# Patient Record
Sex: FEMALE | Race: WHITE | NOT HISPANIC OR LATINO | URBAN - METROPOLITAN AREA
[De-identification: names, ages, dates, MRNs, and addresses within clinical notes are randomized per-mention and may not be internally consistent; named-entity substitution may affect disease eponyms.]

---

## 2023-09-07 ENCOUNTER — OFFICE (OUTPATIENT)
Dept: URBAN - METROPOLITAN AREA CLINIC 19 | Facility: CLINIC | Age: 28
End: 2023-09-07

## 2023-09-07 VITALS
HEIGHT: 64 IN | OXYGEN SATURATION: 97 % | DIASTOLIC BLOOD PRESSURE: 82 MMHG | HEART RATE: 92 BPM | SYSTOLIC BLOOD PRESSURE: 131 MMHG | WEIGHT: 180 LBS

## 2023-09-07 DIAGNOSIS — R19.7 DIARRHEA, UNSPECIFIED: ICD-10-CM

## 2023-09-07 DIAGNOSIS — R12 HEARTBURN: ICD-10-CM

## 2023-09-07 DIAGNOSIS — R10.31 RIGHT LOWER QUADRANT PAIN: ICD-10-CM

## 2023-09-07 DIAGNOSIS — R11.0 NAUSEA: ICD-10-CM

## 2023-09-07 DIAGNOSIS — Z83.71 FAMILY HISTORY OF COLONIC POLYPS: ICD-10-CM

## 2023-09-07 PROCEDURE — 99204 OFFICE O/P NEW MOD 45 MIN: CPT

## 2023-09-07 RX ORDER — SODIUM PICOSULFATE, MAGNESIUM OXIDE, AND ANHYDROUS CITRIC ACID 10; 3.5; 12 MG/160ML; G/160ML; G/160ML
LIQUID ORAL
Qty: 350 | Refills: 0 | Status: COMPLETED
Start: 2023-09-07 | End: 2023-12-20

## 2023-09-07 RX ORDER — PANTOPRAZOLE SODIUM 40 MG/1
40 TABLET, DELAYED RELEASE ORAL
Qty: 30 | Refills: 11 | Status: ACTIVE
Start: 2023-09-07

## 2023-09-07 RX ORDER — DICYCLOMINE HYDROCHLORIDE 10 MG/1
CAPSULE ORAL
Qty: 90 | Refills: 2 | Status: ACTIVE

## 2023-09-07 NOTE — SERVICEHPINOTES
28-year-old single white female with ADHD and bipolar here for evaluation of chronic right lower quadrant pain, chronic diarrhea, changes in her bowel habits and nausea.  She recently moved to [place] from [place].  She has had these symptoms for several years now and has had various workups, but they have been complicated as she has moved multiple times and had to "start over" with all of her different providers.  She has had worsening symptoms over the last several weeks.  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  She has had to take several days off of work because of her symptoms. Her bowel movements are almost always diarrhea, but occasionally she will have urgency and not be able to have a bowel movement.  She usually experiences these symptoms 5 to 6 times a day.  Sometimes the symptoms wake her up from sleep at night.  She has chronic nausea that may be worse with some of her psych medications as they have been adjusted recently.  She has occasional reflux and heartburn and takes Aleve on a regular basis.  She also is on CBD gummies which have helped somewhat with the abdominal pain.  She denies fever, dysphagia or overt GI bleeding.  She had an endoscopy a few years ago in [place] that apparently found "gastritis".  She has never had a colonoscopy.  She currently takes no GI medications.  Family history is negative for IBD.  Her mother had colon polyps at age 55.

## 2023-09-07 NOTE — SERVICENOTES
The patient's assessment was reviewed with Dr. Hein and a collaborative plan of care was established.

## 2023-09-11 LAB
C-REACTIVE PROTEIN, QUANT: <1 MG/L
CBC, PLATELET, NO DIFFERENTIAL: HEMATOCRIT: 40.9 % (ref 34–46.6)
CBC, PLATELET, NO DIFFERENTIAL: HEMOGLOBIN: 13.7 G/DL (ref 11.1–15.9)
CBC, PLATELET, NO DIFFERENTIAL: MCH: 32.2 PG (ref 26.6–33)
CBC, PLATELET, NO DIFFERENTIAL: MCHC: 33.5 G/DL (ref 31.5–35.7)
CBC, PLATELET, NO DIFFERENTIAL: MCV: 96 FL (ref 79–97)
CBC, PLATELET, NO DIFFERENTIAL: PLATELETS: 327 X10E3/UL (ref 150–450)
CBC, PLATELET, NO DIFFERENTIAL: RBC: 4.25 X10E6/UL (ref 3.77–5.28)
CBC, PLATELET, NO DIFFERENTIAL: RDW: 12 % (ref 11.7–15.4)
CBC, PLATELET, NO DIFFERENTIAL: WBC: 5.2 X10E3/UL (ref 3.4–10.8)
CELIAC DISEASE COMPREHENSIVE: DEAMIDATED GLIADIN ABS, IGA: 5 UNITS (ref 0–19)
CELIAC DISEASE COMPREHENSIVE: DEAMIDATED GLIADIN ABS, IGG: 3 UNITS (ref 0–19)
CELIAC DISEASE COMPREHENSIVE: ENDOMYSIAL ANTIBODY IGA: NEGATIVE
CELIAC DISEASE COMPREHENSIVE: IMMUNOGLOBULIN A, QN, SERUM: 218 MG/DL (ref 87–352)
CELIAC DISEASE COMPREHENSIVE: T-TRANSGLUTAMINASE (TTG) IGA: <2 U/ML
CELIAC DISEASE COMPREHENSIVE: T-TRANSGLUTAMINASE (TTG) IGG: 2 U/ML (ref 0–5)
COMP. METABOLIC PANEL (14): A/G RATIO: 1.7 (ref 1.2–2.2)
COMP. METABOLIC PANEL (14): ALBUMIN: 4.8 G/DL (ref 4–5)
COMP. METABOLIC PANEL (14): ALKALINE PHOSPHATASE: 44 IU/L (ref 44–121)
COMP. METABOLIC PANEL (14): ALT (SGPT): 157 IU/L — HIGH (ref 0–32)
COMP. METABOLIC PANEL (14): AST (SGOT): 81 IU/L — HIGH (ref 0–40)
COMP. METABOLIC PANEL (14): BILIRUBIN, TOTAL: 0.4 MG/DL (ref 0–1.2)
COMP. METABOLIC PANEL (14): BUN/CREATININE RATIO: 14 (ref 9–23)
COMP. METABOLIC PANEL (14): BUN: 12 MG/DL (ref 6–20)
COMP. METABOLIC PANEL (14): CALCIUM: 9.5 MG/DL (ref 8.7–10.2)
COMP. METABOLIC PANEL (14): CARBON DIOXIDE, TOTAL: 24 MMOL/L (ref 20–29)
COMP. METABOLIC PANEL (14): CHLORIDE: 104 MMOL/L (ref 96–106)
COMP. METABOLIC PANEL (14): CREATININE: 0.86 MG/DL (ref 0.57–1)
COMP. METABOLIC PANEL (14): EGFR: 93 ML/MIN/1.73 (ref 59–?)
COMP. METABOLIC PANEL (14): GLOBULIN, TOTAL: 2.8 G/DL (ref 1.5–4.5)
COMP. METABOLIC PANEL (14): GLUCOSE: 95 MG/DL (ref 70–99)
COMP. METABOLIC PANEL (14): POTASSIUM: 4.3 MMOL/L (ref 3.5–5.2)
COMP. METABOLIC PANEL (14): PROTEIN, TOTAL: 7.6 G/DL (ref 6–8.5)
COMP. METABOLIC PANEL (14): SODIUM: 144 MMOL/L (ref 134–144)
HCG,BETA SUBUNIT, QNT: HCG,BETA SUBUNIT,QNT,SERUM: <1 MIU/ML
SEDIMENTATION RATE-WESTERGREN: 4 MM/HR (ref 0–32)
TSH: 1.97 UIU/ML (ref 0.45–4.5)

## 2023-12-20 ENCOUNTER — OFFICE (OUTPATIENT)
Dept: URBAN - METROPOLITAN AREA PATHOLOGY 12 | Facility: PATHOLOGY | Age: 28
End: 2023-12-20
Payer: COMMERCIAL

## 2023-12-20 ENCOUNTER — AMBULATORY SURGICAL CENTER (OUTPATIENT)
Dept: URBAN - METROPOLITAN AREA SURGERY 2 | Facility: SURGERY | Age: 28
End: 2023-12-20
Payer: COMMERCIAL

## 2023-12-20 VITALS
DIASTOLIC BLOOD PRESSURE: 93 MMHG | TEMPERATURE: 98.5 F | SYSTOLIC BLOOD PRESSURE: 100 MMHG | SYSTOLIC BLOOD PRESSURE: 132 MMHG | HEART RATE: 95 BPM | RESPIRATION RATE: 16 BRPM | SYSTOLIC BLOOD PRESSURE: 128 MMHG | SYSTOLIC BLOOD PRESSURE: 125 MMHG | SYSTOLIC BLOOD PRESSURE: 142 MMHG | OXYGEN SATURATION: 97 % | RESPIRATION RATE: 18 BRPM | SYSTOLIC BLOOD PRESSURE: 125 MMHG | HEIGHT: 64 IN | SYSTOLIC BLOOD PRESSURE: 128 MMHG | HEART RATE: 95 BPM | SYSTOLIC BLOOD PRESSURE: 132 MMHG | DIASTOLIC BLOOD PRESSURE: 86 MMHG | TEMPERATURE: 98.1 F | RESPIRATION RATE: 16 BRPM | SYSTOLIC BLOOD PRESSURE: 128 MMHG | HEART RATE: 89 BPM | HEART RATE: 103 BPM | RESPIRATION RATE: 16 BRPM | HEIGHT: 64 IN | TEMPERATURE: 98.5 F | HEIGHT: 64 IN | SYSTOLIC BLOOD PRESSURE: 100 MMHG | OXYGEN SATURATION: 100 % | OXYGEN SATURATION: 100 % | HEART RATE: 71 BPM | DIASTOLIC BLOOD PRESSURE: 61 MMHG | OXYGEN SATURATION: 100 % | HEART RATE: 95 BPM | DIASTOLIC BLOOD PRESSURE: 93 MMHG | WEIGHT: 174 LBS | SYSTOLIC BLOOD PRESSURE: 142 MMHG | HEART RATE: 84 BPM | HEART RATE: 103 BPM | SYSTOLIC BLOOD PRESSURE: 100 MMHG | HEART RATE: 89 BPM | TEMPERATURE: 98.5 F | SYSTOLIC BLOOD PRESSURE: 125 MMHG | TEMPERATURE: 98.1 F | DIASTOLIC BLOOD PRESSURE: 79 MMHG | DIASTOLIC BLOOD PRESSURE: 86 MMHG | OXYGEN SATURATION: 97 % | OXYGEN SATURATION: 97 % | HEART RATE: 84 BPM | TEMPERATURE: 98.1 F | HEART RATE: 71 BPM | HEART RATE: 84 BPM | HEART RATE: 71 BPM | DIASTOLIC BLOOD PRESSURE: 79 MMHG | DIASTOLIC BLOOD PRESSURE: 61 MMHG | HEART RATE: 89 BPM | WEIGHT: 174 LBS | RESPIRATION RATE: 18 BRPM | HEART RATE: 103 BPM | RESPIRATION RATE: 18 BRPM | DIASTOLIC BLOOD PRESSURE: 93 MMHG | DIASTOLIC BLOOD PRESSURE: 79 MMHG | SYSTOLIC BLOOD PRESSURE: 132 MMHG | SYSTOLIC BLOOD PRESSURE: 142 MMHG | DIASTOLIC BLOOD PRESSURE: 86 MMHG | DIASTOLIC BLOOD PRESSURE: 61 MMHG | WEIGHT: 174 LBS

## 2023-12-20 DIAGNOSIS — K21.9 GASTRO-ESOPHAGEAL REFLUX DISEASE WITHOUT ESOPHAGITIS: ICD-10-CM

## 2023-12-20 DIAGNOSIS — R19.7 DIARRHEA, UNSPECIFIED: ICD-10-CM

## 2023-12-20 PROCEDURE — 88342 IMHCHEM/IMCYTCHM 1ST ANTB: CPT | Performed by: STUDENT IN AN ORGANIZED HEALTH CARE EDUCATION/TRAINING PROGRAM

## 2023-12-20 PROCEDURE — 43239 EGD BIOPSY SINGLE/MULTIPLE: CPT | Mod: 51 | Performed by: INTERNAL MEDICINE

## 2023-12-20 PROCEDURE — 88305 TISSUE EXAM BY PATHOLOGIST: CPT | Performed by: STUDENT IN AN ORGANIZED HEALTH CARE EDUCATION/TRAINING PROGRAM

## 2023-12-20 PROCEDURE — 45380 COLONOSCOPY AND BIOPSY: CPT | Performed by: INTERNAL MEDICINE

## 2023-12-20 NOTE — SERVICEHPINOTES
28-year-old single white female with ADHD and bipolar here for evaluation of chronic right lower quadrant pain, chronic diarrhea, changes in her bowel habits and nausea.  She recently moved to [place] from [place].  She has had these symptoms for several years now and has had various workups, but they have been complicated as she has moved multiple times and had to "start over" with all of her different providers.  She has had worsening symptoms over the last several weeks.  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  She has had to take several days off of work because of her symptoms. Her bowel movements are almost always diarrhea, but occasionally she will have urgency and not be able to have a bowel movement.  She usually experiences these symptoms 5 to 6 times a day.  Sometimes the symptoms wake her up from sleep at night.  She has chronic nausea that may be worse with some of her psych medications as they have been adjusted recently.  She has occasional reflux and heartburn and takes Aleve on a regular basis.  We gave her Protonix 40 mg/d which "helped a little bit", but she only took it for a month (11 refills were given with her month supply).  She also is on CBD gummies which have helped somewhat with the abdominal pain.  She denies fever, dysphagia or overt GI bleeding.  She had an endoscopy a few years ago in [place] that apparently found "gastritis".  Routine lab and CT abd/pelvis 9/30/23 were n normal. She has never had a colonoscopy.  She currently takes no GI medications.  Family history is negative for IBD.  Her mother had colon polyps at age 55.

## 2023-12-20 NOTE — SERVICEHPINOTES
28-year-old single white female with ADHD and bipolar here for evaluation of chronic right lower quadrant pain, chronic diarrhea, changes in her bowel habits and nausea.  She recently moved to [place] from [place].  She has had these symptoms for several years now and has had various workups, but they have been complicated as she has moved multiple times and had to "start over" with all of her different providers.  She has had worsening symptoms over the last several weeks.  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  She has had to take several days off of work because of her symptoms. Her bowel movements are almost always diarrhea, but occasionally she will have urgency and not be able to have a bowel movement.  She usually experiences these symptoms 5 to 6 times a day.  Sometimes the symptoms wake her up from sleep at night.  She has chronic nausea that may be worse with some of her psych medications as they have been adjusted recently.  She has occasional reflux and heartburn and takes Aleve on a regular basis.  We gave her Protonix 40 mg/d which "helped a little bit", but she only took it for a month (11 refills were given with her month supply).  She also is on CBD gummies which have helped somewhat with the abdominal pain.  She denies fever, dysphagia or overt GI bleeding.  She had an endoscopy a few years ago in [place] that apparently found "gastritis".  Routine lab and CT abd/pelvis 9/30/23 were n normal. She has never had a colonoscopy.  She currently takes no GI medications.  Family history is negative for IBD.  Her mother had colon polyps at age 55.

## 2023-12-20 NOTE — SERVICEHPINOTES
28-year-old single white female with ADHD and bipolar here for evaluation of chronic right lower quadrant pain, chronic diarrhea, changes in her bowel habits and nausea.  She recently moved to [place] from [place].  She has had these symptoms for several years now and has had various workups, but they have been complicated as she has moved multiple times and had to "start over" with all of her different providers.  She has had worsening symptoms over the last several weeks.  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  She has had to take several days off of work because of her symptoms. Her bowel movements are almost always diarrhea, but occasionally she will have urgency and not be able to have a bowel movement.  She usually experiences these symptoms 5 to 6 times a day.  Sometimes the symptoms wake her up from sleep at night.  She has chronic nausea that may be worse with some of her psych medications as they have been adjusted recently.  She has occasional reflux and heartburn and takes Aleve on a regular basis.  We gave her Protonix 40 mg/d which "helped a little bit", but she only took it for a month (11 refills were given with her month supply).  She also is on CBD gummies which have helped somewhat with the abdominal pain.  She denies fever, dysphagia or overt GI bleeding.  She had an endoscopy a few years ago in [place] that apparently found "gastritis".  Routine lab and CT abd/pelvis 9/30/23 were n normal. She has never had a colonoscopy.  She currently takes no GI medications.  Family history is negative for IBD.  Her mother had colon polyps at age 55.

## 2023-12-22 LAB
GASTRO ONE PATHOLOGY: PDF REPORT: (no result)